# Patient Record
Sex: FEMALE | Race: BLACK OR AFRICAN AMERICAN | NOT HISPANIC OR LATINO | Employment: UNEMPLOYED | URBAN - METROPOLITAN AREA
[De-identification: names, ages, dates, MRNs, and addresses within clinical notes are randomized per-mention and may not be internally consistent; named-entity substitution may affect disease eponyms.]

---

## 2018-06-21 ENCOUNTER — HOSPITAL ENCOUNTER (OUTPATIENT)
Facility: MEDICAL CENTER | Age: 33
End: 2018-06-21
Attending: OBSTETRICS & GYNECOLOGY | Admitting: OBSTETRICS & GYNECOLOGY

## 2018-06-21 VITALS
HEART RATE: 108 BPM | DIASTOLIC BLOOD PRESSURE: 70 MMHG | SYSTOLIC BLOOD PRESSURE: 118 MMHG | RESPIRATION RATE: 16 BRPM | TEMPERATURE: 98.1 F

## 2018-06-21 LAB
ALBUMIN SERPL BCP-MCNC: 3.8 G/DL (ref 3.2–4.9)
ALBUMIN/GLOB SERPL: 1.3 G/DL
ALP SERPL-CCNC: 166 U/L (ref 30–99)
ALT SERPL-CCNC: 11 U/L (ref 2–50)
ANION GAP SERPL CALC-SCNC: 10 MMOL/L (ref 0–11.9)
APPEARANCE UR: CLEAR
AST SERPL-CCNC: 19 U/L (ref 12–45)
BASOPHILS # BLD AUTO: 0.3 % (ref 0–1.8)
BASOPHILS # BLD: 0.02 K/UL (ref 0–0.12)
BILIRUB SERPL-MCNC: 0.3 MG/DL (ref 0.1–1.5)
BUN SERPL-MCNC: 6 MG/DL (ref 8–22)
CALCIUM SERPL-MCNC: 9.4 MG/DL (ref 8.5–10.5)
CHLORIDE SERPL-SCNC: 104 MMOL/L (ref 96–112)
CO2 SERPL-SCNC: 24 MMOL/L (ref 20–33)
COLOR UR AUTO: YELLOW
CREAT SERPL-MCNC: 0.76 MG/DL (ref 0.5–1.4)
EOSINOPHIL # BLD AUTO: 0.07 K/UL (ref 0–0.51)
EOSINOPHIL NFR BLD: 0.9 % (ref 0–6.9)
ERYTHROCYTE [DISTWIDTH] IN BLOOD BY AUTOMATED COUNT: 40.6 FL (ref 35.9–50)
GLOBULIN SER CALC-MCNC: 3 G/DL (ref 1.9–3.5)
GLUCOSE BLD-MCNC: 130 MG/DL (ref 65–99)
GLUCOSE SERPL-MCNC: 131 MG/DL (ref 65–99)
GLUCOSE UR QL STRIP.AUTO: >=1000 MG/DL
HCT VFR BLD AUTO: 35.7 % (ref 37–47)
HGB BLD-MCNC: 12 G/DL (ref 12–16)
IMM GRANULOCYTES # BLD AUTO: 0.11 K/UL (ref 0–0.11)
IMM GRANULOCYTES NFR BLD AUTO: 1.4 % (ref 0–0.9)
KETONES UR QL STRIP.AUTO: NEGATIVE MG/DL
LEUKOCYTE ESTERASE UR QL STRIP.AUTO: ABNORMAL
LYMPHOCYTES # BLD AUTO: 1.89 K/UL (ref 1–4.8)
LYMPHOCYTES NFR BLD: 24.4 % (ref 22–41)
MCH RBC QN AUTO: 28.8 PG (ref 27–33)
MCHC RBC AUTO-ENTMCNC: 33.6 G/DL (ref 33.6–35)
MCV RBC AUTO: 85.8 FL (ref 81.4–97.8)
MONOCYTES # BLD AUTO: 0.81 K/UL (ref 0–0.85)
MONOCYTES NFR BLD AUTO: 10.4 % (ref 0–13.4)
NEUTROPHILS # BLD AUTO: 4.86 K/UL (ref 2–7.15)
NEUTROPHILS NFR BLD: 62.6 % (ref 44–72)
NITRITE UR QL STRIP.AUTO: NEGATIVE
NRBC # BLD AUTO: 0 K/UL
NRBC BLD-RTO: 0 /100 WBC
PH UR STRIP.AUTO: 7 [PH]
PLATELET # BLD AUTO: 207 K/UL (ref 164–446)
PMV BLD AUTO: 11.5 FL (ref 9–12.9)
POTASSIUM SERPL-SCNC: 3.6 MMOL/L (ref 3.6–5.5)
PROT SERPL-MCNC: 6.8 G/DL (ref 6–8.2)
PROT UR QL STRIP: 30 MG/DL
RBC # BLD AUTO: 4.16 M/UL (ref 4.2–5.4)
RBC UR QL AUTO: ABNORMAL
SODIUM SERPL-SCNC: 138 MMOL/L (ref 135–145)
SP GR UR: 1.01
URATE SERPL-MCNC: 1.7 MG/DL (ref 1.9–8.2)
WBC # BLD AUTO: 7.8 K/UL (ref 4.8–10.8)

## 2018-06-21 PROCEDURE — 59025 FETAL NON-STRESS TEST: CPT | Performed by: OBSTETRICS & GYNECOLOGY

## 2018-06-21 PROCEDURE — 84550 ASSAY OF BLOOD/URIC ACID: CPT

## 2018-06-21 PROCEDURE — 85025 COMPLETE CBC W/AUTO DIFF WBC: CPT

## 2018-06-21 PROCEDURE — 80053 COMPREHEN METABOLIC PANEL: CPT

## 2018-06-21 PROCEDURE — 82962 GLUCOSE BLOOD TEST: CPT

## 2018-06-21 PROCEDURE — 36415 COLL VENOUS BLD VENIPUNCTURE: CPT

## 2018-06-21 PROCEDURE — 81002 URINALYSIS NONAUTO W/O SCOPE: CPT

## 2018-06-21 NOTE — PROGRESS NOTES
[facility] LABOR AND DELIVERY TRIAGE PROGRESS NOTE    PATIENT ID:  NAME:  Salomon Ramos  MRN:               [id number]  YOB: 1985     33 y.o. female  at 33w1d by LMP and confirmed by 7 wk US - has had good PNC in [place] and came to [place] 1 day ago.    Subjective: Pt reports to triage wanting to make sure baby is doing fine. No complaints whatsoever, but wants to get checked.   Chart reviewing from her prenatal notes (she brought with her) show hx of progestin and nifedipine during pregnancy - she reports taking nothing recently.  Pregnancy complicated by elevated GTT - taking no medication for it    negative  For CTXS.   negative Feels pain   negative for LOF  negative for vaginal bleeding.   positive for fetal movement    ROS: Patient denies any fever chills, nausea, vomiting, headache, chest pain, shortness of breath, or dysuria or unusual swelling of hands or feet.     Surgery Hx:  None    PMH:  None  Denies any hx of STI's    Meds:  PNV    Allergies:  NKDA    Substance:  Denies alcohol/tobacco/recreational drug use      Objective:    Vitals:    18 0910 18 0921   BP: 140/91 132/84   Pulse: 94 95     No data recorded.    General: pleasant gravid female, No acute distress, resting comfortably in bed  HEENT: normocephalic, nontraumatic, PERRLA, EOMI, neck supple  Cardiovascular: Heart RRR with grade 2 systolic murmur. Distal Pulses 2+  Respiratory: symmetric chest expansion, lungs CTAB, with no wheezes, rales, rhonci  Abdomen: gravid, nontender, leopolds cephalic, fundal height measures 33 cm  Musculoskeletal: strength 5/5 in four extremities  Neuro: non focal with no numbness, tingling or changes in sensation    Cervix:  closed/thick/high  [place]: Uterine Contractions Q5 minutes but feeling nothing  FHRM: Baseline 150, Accels to 165, no decels, moderate variability    Assessment: 33 y.o. female  at 33w1d w/ GDMA1 - not in active labor - isolated elevated BP now w/ normal range  pressures    Fingerstick glucose 130  Urine dip w/ glucose and trace protein    Plan:   1. [facility]H labs- will d/c if result normal  2. Triage RN made appointment for her for 2 wks from now w/ Dr. Fair  3. Scan documents  4. Routine prenatal return precautions (cxn's, bleeding, LOF, decreased movement)  5. Increased hydration and impoved diet and activity    Discussed case with Dr. Valencia, TPN Attending. Case was discussed and attending agreed with plan prior to discharge of patient.

## 2018-06-21 NOTE — PROGRESS NOTES
"0905-pt presents from home to \"check on the baby\", states that she moved here from [facility] 2 days ago and tried to set up care at [facility] and was informed that they don't have an appointment for her, so she wanted to make sure the baby was OK after her move, no c/o leaking, bleeding, pain or uc's, states baby is moving normally, placed on external monitors, vs taken and set for q 10 min due to elevation, ua dipped with 1+ protein, denies headaches, visual disturbances and RUQ pain, reflexes normal, no clonus, mild swelling BL in lower extremities, TC Dr Obregon, will be in to see pt  0930-Dr Obregon in, assessment done, wants fFn collected and SVE, if closed no need to send fFn, also PIH labs ordered  0935-SSE performed, cervix noted and very long, fFn collected, SVE closed/thick/high  0940-PIH labs collected and sent  1020-labs back, TC Dr Obregon, reviewed labs and pressures, discharge order received  1030-pt discharged home with PTL precautions and an appt set up with Dr Abdalla, verbalized understanding, left ambulatory on her own  "

## 2018-07-29 ENCOUNTER — APPOINTMENT (OUTPATIENT)
Dept: RADIOLOGY | Facility: MEDICAL CENTER | Age: 33
End: 2018-07-29
Attending: NURSE PRACTITIONER
Payer: COMMERCIAL

## 2018-07-29 ENCOUNTER — HOSPITAL ENCOUNTER (INPATIENT)
Facility: MEDICAL CENTER | Age: 33
LOS: 2 days | End: 2018-07-31
Attending: OBSTETRICS & GYNECOLOGY | Admitting: OBSTETRICS & GYNECOLOGY
Payer: COMMERCIAL

## 2018-07-29 LAB
ABO GROUP BLD: NORMAL
ALBUMIN SERPL BCP-MCNC: 3.1 G/DL (ref 3.2–4.9)
ALBUMIN/GLOB SERPL: 1.4 G/DL
ALP SERPL-CCNC: 170 U/L (ref 30–99)
ALT SERPL-CCNC: 15 U/L (ref 2–50)
AMPHET UR QL SCN: NEGATIVE
ANION GAP SERPL CALC-SCNC: 11 MMOL/L (ref 0–11.9)
AST SERPL-CCNC: 36 U/L (ref 12–45)
AST SERPL-CCNC: 37 U/L (ref 12–45)
BARBITURATES UR QL SCN: NEGATIVE
BASOPHILS # BLD AUTO: 0.2 % (ref 0–1.8)
BASOPHILS # BLD: 0.02 K/UL (ref 0–0.12)
BENZODIAZ UR QL SCN: NEGATIVE
BILIRUB SERPL-MCNC: 0.4 MG/DL (ref 0.1–1.5)
BLD GP AB SCN SERPL QL: NORMAL
BUN SERPL-MCNC: 9 MG/DL (ref 8–22)
BUN SERPL-MCNC: 9 MG/DL (ref 8–22)
BZE UR QL SCN: NEGATIVE
CALCIUM SERPL-MCNC: 8.8 MG/DL (ref 8.5–10.5)
CANNABINOIDS UR QL SCN: NEGATIVE
CHLORIDE SERPL-SCNC: 102 MMOL/L (ref 96–112)
CO2 SERPL-SCNC: 18 MMOL/L (ref 20–33)
CREAT SERPL-MCNC: 0.69 MG/DL (ref 0.5–1.4)
CREAT SERPL-MCNC: 0.71 MG/DL (ref 0.5–1.4)
EOSINOPHIL # BLD AUTO: 0.01 K/UL (ref 0–0.51)
EOSINOPHIL NFR BLD: 0.1 % (ref 0–6.9)
ERYTHROCYTE [DISTWIDTH] IN BLOOD BY AUTOMATED COUNT: 43.2 FL (ref 35.9–50)
ERYTHROCYTE [DISTWIDTH] IN BLOOD BY AUTOMATED COUNT: 43.4 FL (ref 35.9–50)
GLOBULIN SER CALC-MCNC: 2.2 G/DL (ref 1.9–3.5)
GLUCOSE SERPL-MCNC: 160 MG/DL (ref 65–99)
HBV SURFACE AG SER QL: NEGATIVE
HCT VFR BLD AUTO: 33.8 % (ref 37–47)
HCT VFR BLD AUTO: 40.1 % (ref 37–47)
HGB BLD-MCNC: 11.2 G/DL (ref 12–16)
HGB BLD-MCNC: 13.3 G/DL (ref 12–16)
HOLDING TUBE BB 8507: NORMAL
IMM GRANULOCYTES # BLD AUTO: 0.1 K/UL (ref 0–0.11)
IMM GRANULOCYTES NFR BLD AUTO: 1 % (ref 0–0.9)
LYMPHOCYTES # BLD AUTO: 1.97 K/UL (ref 1–4.8)
LYMPHOCYTES NFR BLD: 18.9 % (ref 22–41)
MAGNESIUM SERPL-MCNC: 4.6 MG/DL (ref 1.5–2.5)
MAGNESIUM SERPL-MCNC: 5.4 MG/DL (ref 1.5–2.5)
MCH RBC QN AUTO: 28.8 PG (ref 27–33)
MCH RBC QN AUTO: 28.9 PG (ref 27–33)
MCHC RBC AUTO-ENTMCNC: 33.1 G/DL (ref 33.6–35)
MCHC RBC AUTO-ENTMCNC: 33.2 G/DL (ref 33.6–35)
MCV RBC AUTO: 86.8 FL (ref 81.4–97.8)
MCV RBC AUTO: 87.1 FL (ref 81.4–97.8)
METHADONE UR QL SCN: NEGATIVE
MONOCYTES # BLD AUTO: 1.05 K/UL (ref 0–0.85)
MONOCYTES NFR BLD AUTO: 10.1 % (ref 0–13.4)
NEUTROPHILS # BLD AUTO: 7.26 K/UL (ref 2–7.15)
NEUTROPHILS NFR BLD: 69.7 % (ref 44–72)
NRBC # BLD AUTO: 0.02 K/UL
NRBC BLD-RTO: 0.2 /100 WBC
OPIATES UR QL SCN: NEGATIVE
OXYCODONE UR QL SCN: NEGATIVE
PCP UR QL SCN: NEGATIVE
PLATELET # BLD AUTO: 151 K/UL (ref 164–446)
PLATELET # BLD AUTO: 156 K/UL (ref 164–446)
PMV BLD AUTO: 12.3 FL (ref 9–12.9)
PMV BLD AUTO: 12.6 FL (ref 9–12.9)
POTASSIUM SERPL-SCNC: 4.2 MMOL/L (ref 3.6–5.5)
PROPOXYPH UR QL SCN: NEGATIVE
PROT SERPL-MCNC: 5.3 G/DL (ref 6–8.2)
RBC # BLD AUTO: 3.88 M/UL (ref 4.2–5.4)
RBC # BLD AUTO: 4.62 M/UL (ref 4.2–5.4)
RH BLD: NORMAL
RUBV AB SER QL: 1.9 IU/ML
SODIUM SERPL-SCNC: 131 MMOL/L (ref 135–145)
TREPONEMA PALLIDUM IGG+IGM AB [PRESENCE] IN SERUM OR PLASMA BY IMMUNOASSAY: NON REACTIVE
URATE SERPL-MCNC: 4.5 MG/DL (ref 1.9–8.2)
WBC # BLD AUTO: 10.4 K/UL (ref 4.8–10.8)
WBC # BLD AUTO: 19 K/UL (ref 4.8–10.8)

## 2018-07-29 PROCEDURE — 84550 ASSAY OF BLOOD/URIC ACID: CPT

## 2018-07-29 PROCEDURE — 700111 HCHG RX REV CODE 636 W/ 250 OVERRIDE (IP)

## 2018-07-29 PROCEDURE — 36415 COLL VENOUS BLD VENIPUNCTURE: CPT

## 2018-07-29 PROCEDURE — 80307 DRUG TEST PRSMV CHEM ANLYZR: CPT

## 2018-07-29 PROCEDURE — 87340 HEPATITIS B SURFACE AG IA: CPT

## 2018-07-29 PROCEDURE — 700101 HCHG RX REV CODE 250

## 2018-07-29 PROCEDURE — 0UQGXZZ REPAIR VAGINA, EXTERNAL APPROACH: ICD-10-PCS | Performed by: OBSTETRICS & GYNECOLOGY

## 2018-07-29 PROCEDURE — 84520 ASSAY OF UREA NITROGEN: CPT

## 2018-07-29 PROCEDURE — 86762 RUBELLA ANTIBODY: CPT

## 2018-07-29 PROCEDURE — 80053 COMPREHEN METABOLIC PANEL: CPT

## 2018-07-29 PROCEDURE — 86780 TREPONEMA PALLIDUM: CPT

## 2018-07-29 PROCEDURE — 304965 HCHG RECOVERY SERVICES

## 2018-07-29 PROCEDURE — 770002 HCHG ROOM/CARE - OB PRIVATE (112)

## 2018-07-29 PROCEDURE — 59409 OBSTETRICAL CARE: CPT

## 2018-07-29 PROCEDURE — 85025 COMPLETE CBC W/AUTO DIFF WBC: CPT

## 2018-07-29 PROCEDURE — 84450 TRANSFERASE (AST) (SGOT): CPT

## 2018-07-29 PROCEDURE — 82565 ASSAY OF CREATININE: CPT

## 2018-07-29 PROCEDURE — 76815 OB US LIMITED FETUS(S): CPT

## 2018-07-29 PROCEDURE — 83735 ASSAY OF MAGNESIUM: CPT

## 2018-07-29 PROCEDURE — 700105 HCHG RX REV CODE 258: Performed by: NURSE PRACTITIONER

## 2018-07-29 PROCEDURE — 0KQM0ZZ REPAIR PERINEUM MUSCLE, OPEN APPROACH: ICD-10-PCS | Performed by: OBSTETRICS & GYNECOLOGY

## 2018-07-29 PROCEDURE — 85027 COMPLETE CBC AUTOMATED: CPT

## 2018-07-29 PROCEDURE — 700111 HCHG RX REV CODE 636 W/ 250 OVERRIDE (IP): Performed by: STUDENT IN AN ORGANIZED HEALTH CARE EDUCATION/TRAINING PROGRAM

## 2018-07-29 PROCEDURE — 700105 HCHG RX REV CODE 258

## 2018-07-29 PROCEDURE — 0UQMXZZ REPAIR VULVA, EXTERNAL APPROACH: ICD-10-PCS | Performed by: OBSTETRICS & GYNECOLOGY

## 2018-07-29 RX ORDER — MAGNESIUM SULFATE HEPTAHYDRATE 40 MG/ML
4 INJECTION, SOLUTION INTRAVENOUS ONCE
Status: COMPLETED | OUTPATIENT
Start: 2018-07-29 | End: 2018-07-29

## 2018-07-29 RX ORDER — HYDROCODONE BITARTRATE AND ACETAMINOPHEN 10; 325 MG/1; MG/1
1 TABLET ORAL EVERY 4 HOURS PRN
Status: DISCONTINUED | OUTPATIENT
Start: 2018-07-29 | End: 2018-07-31 | Stop reason: HOSPADM

## 2018-07-29 RX ORDER — HYDROCODONE BITARTRATE AND ACETAMINOPHEN 5; 325 MG/1; MG/1
1 TABLET ORAL EVERY 4 HOURS PRN
Status: DISCONTINUED | OUTPATIENT
Start: 2018-07-29 | End: 2018-07-31 | Stop reason: HOSPADM

## 2018-07-29 RX ORDER — ONDANSETRON 2 MG/ML
4 INJECTION INTRAMUSCULAR; INTRAVENOUS EVERY 6 HOURS PRN
Status: DISCONTINUED | OUTPATIENT
Start: 2018-07-29 | End: 2018-07-29

## 2018-07-29 RX ORDER — LIDOCAINE HYDROCHLORIDE 10 MG/ML
INJECTION, SOLUTION EPIDURAL; INFILTRATION; INTRACAUDAL; PERINEURAL
Status: COMPLETED
Start: 2018-07-29 | End: 2018-07-29

## 2018-07-29 RX ORDER — LABETALOL HYDROCHLORIDE 5 MG/ML
20-80 INJECTION, SOLUTION INTRAVENOUS PRN
Status: DISCONTINUED | OUTPATIENT
Start: 2018-07-29 | End: 2018-07-31 | Stop reason: HOSPADM

## 2018-07-29 RX ORDER — MAGNESIUM SULFATE HEPTAHYDRATE 40 MG/ML
2 INJECTION, SOLUTION INTRAVENOUS CONTINUOUS
Status: DISCONTINUED | OUTPATIENT
Start: 2018-07-29 | End: 2018-07-29

## 2018-07-29 RX ORDER — ACETAMINOPHEN 325 MG/1
325 TABLET ORAL EVERY 4 HOURS PRN
Status: DISCONTINUED | OUTPATIENT
Start: 2018-07-29 | End: 2018-07-29

## 2018-07-29 RX ORDER — DOCUSATE SODIUM 100 MG/1
100 CAPSULE, LIQUID FILLED ORAL 2 TIMES DAILY PRN
Status: DISCONTINUED | OUTPATIENT
Start: 2018-07-29 | End: 2018-07-31 | Stop reason: HOSPADM

## 2018-07-29 RX ORDER — CITRIC ACID/SODIUM CITRATE 334-500MG
30 SOLUTION, ORAL ORAL EVERY 6 HOURS PRN
Status: DISCONTINUED | OUTPATIENT
Start: 2018-07-29 | End: 2018-07-29 | Stop reason: HOSPADM

## 2018-07-29 RX ORDER — LABETALOL HYDROCHLORIDE 5 MG/ML
INJECTION, SOLUTION INTRAVENOUS
Status: COMPLETED
Start: 2018-07-29 | End: 2018-07-29

## 2018-07-29 RX ORDER — VITAMIN A ACETATE, BETA CAROTENE, ASCORBIC ACID, CHOLECALCIFEROL, .ALPHA.-TOCOPHEROL ACETATE, DL-, THIAMINE MONONITRATE, RIBOFLAVIN, NIACINAMIDE, PYRIDOXINE HYDROCHLORIDE, FOLIC ACID, CYANOCOBALAMIN, CALCIUM CARBONATE, FERROUS FUMARATE, ZINC OXIDE, CUPRIC OXIDE 3080; 12; 120; 400; 1; 1.84; 3; 20; 22; 920; 25; 200; 27; 10; 2 [IU]/1; UG/1; MG/1; [IU]/1; MG/1; MG/1; MG/1; MG/1; MG/1; [IU]/1; MG/1; MG/1; MG/1; MG/1; MG/1
1 TABLET, FILM COATED ORAL EVERY MORNING
Status: DISCONTINUED | OUTPATIENT
Start: 2018-07-30 | End: 2018-07-31 | Stop reason: HOSPADM

## 2018-07-29 RX ORDER — CARBOPROST TROMETHAMINE 250 UG/ML
250 INJECTION, SOLUTION INTRAMUSCULAR
Status: DISCONTINUED | OUTPATIENT
Start: 2018-07-29 | End: 2018-07-29 | Stop reason: HOSPADM

## 2018-07-29 RX ORDER — ALUMINA, MAGNESIA, AND SIMETHICONE 2400; 2400; 240 MG/30ML; MG/30ML; MG/30ML
30 SUSPENSION ORAL EVERY 6 HOURS PRN
Status: DISCONTINUED | OUTPATIENT
Start: 2018-07-29 | End: 2018-07-29 | Stop reason: HOSPADM

## 2018-07-29 RX ORDER — ONDANSETRON 4 MG/1
4 TABLET, ORALLY DISINTEGRATING ORAL EVERY 6 HOURS PRN
Status: DISCONTINUED | OUTPATIENT
Start: 2018-07-29 | End: 2018-07-31 | Stop reason: HOSPADM

## 2018-07-29 RX ORDER — HYDRALAZINE HYDROCHLORIDE 20 MG/ML
5-10 INJECTION INTRAMUSCULAR; INTRAVENOUS PRN
Status: DISCONTINUED | OUTPATIENT
Start: 2018-07-29 | End: 2018-07-31 | Stop reason: HOSPADM

## 2018-07-29 RX ORDER — IBUPROFEN 800 MG/1
800 TABLET ORAL EVERY 8 HOURS PRN
Status: DISCONTINUED | OUTPATIENT
Start: 2018-07-29 | End: 2018-07-29

## 2018-07-29 RX ORDER — SODIUM CHLORIDE, SODIUM LACTATE, POTASSIUM CHLORIDE, CALCIUM CHLORIDE 600; 310; 30; 20 MG/100ML; MG/100ML; MG/100ML; MG/100ML
INJECTION, SOLUTION INTRAVENOUS CONTINUOUS
Status: DISCONTINUED | OUTPATIENT
Start: 2018-07-29 | End: 2018-07-29 | Stop reason: HOSPADM

## 2018-07-29 RX ORDER — LIDOCAINE HYDROCHLORIDE AND EPINEPHRINE 15; 5 MG/ML; UG/ML
INJECTION, SOLUTION EPIDURAL
Status: COMPLETED
Start: 2018-07-29 | End: 2018-07-29

## 2018-07-29 RX ORDER — IBUPROFEN 800 MG/1
800 TABLET ORAL EVERY 8 HOURS PRN
Status: DISCONTINUED | OUTPATIENT
Start: 2018-07-29 | End: 2018-07-31 | Stop reason: HOSPADM

## 2018-07-29 RX ORDER — MAGNESIUM SULFATE HEPTAHYDRATE 40 MG/ML
INJECTION, SOLUTION INTRAVENOUS
Status: COMPLETED
Start: 2018-07-29 | End: 2018-07-29

## 2018-07-29 RX ORDER — SODIUM CHLORIDE, SODIUM LACTATE, POTASSIUM CHLORIDE, CALCIUM CHLORIDE 600; 310; 30; 20 MG/100ML; MG/100ML; MG/100ML; MG/100ML
INJECTION, SOLUTION INTRAVENOUS
Status: COMPLETED
Start: 2018-07-29 | End: 2018-07-29

## 2018-07-29 RX ORDER — METHYLERGONOVINE MALEATE 0.2 MG/ML
0.2 INJECTION INTRAVENOUS
Status: DISCONTINUED | OUTPATIENT
Start: 2018-07-29 | End: 2018-07-29 | Stop reason: HOSPADM

## 2018-07-29 RX ORDER — MISOPROSTOL 200 UG/1
800 TABLET ORAL
Status: DISCONTINUED | OUTPATIENT
Start: 2018-07-29 | End: 2018-07-29 | Stop reason: HOSPADM

## 2018-07-29 RX ORDER — ACETAMINOPHEN 325 MG/1
325 TABLET ORAL EVERY 4 HOURS PRN
Status: DISCONTINUED | OUTPATIENT
Start: 2018-07-29 | End: 2018-07-31 | Stop reason: HOSPADM

## 2018-07-29 RX ORDER — MAGNESIUM SULFATE HEPTAHYDRATE 40 MG/ML
2 INJECTION, SOLUTION INTRAVENOUS CONTINUOUS
Status: DISCONTINUED | OUTPATIENT
Start: 2018-07-29 | End: 2018-07-31 | Stop reason: HOSPADM

## 2018-07-29 RX ORDER — METOCLOPRAMIDE HYDROCHLORIDE 5 MG/ML
10 INJECTION INTRAMUSCULAR; INTRAVENOUS EVERY 6 HOURS PRN
Status: DISCONTINUED | OUTPATIENT
Start: 2018-07-29 | End: 2018-07-31 | Stop reason: HOSPADM

## 2018-07-29 RX ORDER — ONDANSETRON 2 MG/ML
4 INJECTION INTRAMUSCULAR; INTRAVENOUS EVERY 6 HOURS PRN
Status: DISCONTINUED | OUTPATIENT
Start: 2018-07-29 | End: 2018-07-31 | Stop reason: HOSPADM

## 2018-07-29 RX ORDER — MISOPROSTOL 200 UG/1
800 TABLET ORAL
Status: DISCONTINUED | OUTPATIENT
Start: 2018-07-29 | End: 2018-07-31 | Stop reason: HOSPADM

## 2018-07-29 RX ORDER — OXYCODONE HYDROCHLORIDE AND ACETAMINOPHEN 5; 325 MG/1; MG/1
1 TABLET ORAL EVERY 4 HOURS PRN
Status: DISCONTINUED | OUTPATIENT
Start: 2018-07-29 | End: 2018-07-31 | Stop reason: HOSPADM

## 2018-07-29 RX ORDER — CALCIUM GLUCONATE 94 MG/ML
1 INJECTION, SOLUTION INTRAVENOUS
Status: DISCONTINUED | OUTPATIENT
Start: 2018-07-29 | End: 2018-07-31 | Stop reason: HOSPADM

## 2018-07-29 RX ORDER — OXYTOCIN 10 [USP'U]/ML
10 INJECTION, SOLUTION INTRAMUSCULAR; INTRAVENOUS
Status: DISCONTINUED | OUTPATIENT
Start: 2018-07-29 | End: 2018-07-29 | Stop reason: HOSPADM

## 2018-07-29 RX ORDER — ACETAMINOPHEN 325 MG/1
650 TABLET ORAL EVERY 4 HOURS PRN
Status: DISCONTINUED | OUTPATIENT
Start: 2018-07-29 | End: 2018-07-31 | Stop reason: HOSPADM

## 2018-07-29 RX ORDER — OXYCODONE HYDROCHLORIDE AND ACETAMINOPHEN 5; 325 MG/1; MG/1
2 TABLET ORAL EVERY 4 HOURS PRN
Status: DISCONTINUED | OUTPATIENT
Start: 2018-07-29 | End: 2018-07-31 | Stop reason: HOSPADM

## 2018-07-29 RX ADMIN — LABETALOL HYDROCHLORIDE 20 MG: 5 INJECTION, SOLUTION INTRAVENOUS at 06:46

## 2018-07-29 RX ADMIN — SODIUM CHLORIDE, POTASSIUM CHLORIDE, SODIUM LACTATE AND CALCIUM CHLORIDE: 600; 310; 30; 20 INJECTION, SOLUTION INTRAVENOUS at 04:20

## 2018-07-29 RX ADMIN — SODIUM CHLORIDE, POTASSIUM CHLORIDE, SODIUM LACTATE AND CALCIUM CHLORIDE 1000 ML: 600; 310; 30; 20 INJECTION, SOLUTION INTRAVENOUS at 21:36

## 2018-07-29 RX ADMIN — LABETALOL HYDROCHLORIDE 20 MG: 5 INJECTION, SOLUTION INTRAVENOUS at 04:59

## 2018-07-29 RX ADMIN — MAGNESIUM SULFATE IN WATER 2 G/HR: 40 INJECTION, SOLUTION INTRAVENOUS at 05:47

## 2018-07-29 RX ADMIN — LIDOCAINE HYDROCHLORIDE: 10 INJECTION, SOLUTION EPIDURAL; INFILTRATION; INTRACAUDAL; PERINEURAL at 08:40

## 2018-07-29 RX ADMIN — MAGNESIUM SULFATE HEPTAHYDRATE 4 G: 40 INJECTION, SOLUTION INTRAVENOUS at 05:26

## 2018-07-29 RX ADMIN — MAGNESIUM SULFATE IN WATER 2 G/HR: 40 INJECTION, SOLUTION INTRAVENOUS at 16:36

## 2018-07-29 RX ADMIN — MAGNESIUM SULFATE IN WATER 4 G: 40 INJECTION, SOLUTION INTRAVENOUS at 05:26

## 2018-07-29 RX ADMIN — LIDOCAINE HYDROCHLORIDE: 10 INJECTION, SOLUTION EPIDURAL; INFILTRATION; INTRACAUDAL; PERINEURAL at 08:20

## 2018-07-29 RX ADMIN — MAGNESIUM SULFATE HEPTAHYDRATE 2 G/HR: 40 INJECTION, SOLUTION INTRAVENOUS at 05:47

## 2018-07-29 RX ADMIN — Medication 20 UNITS: at 08:20

## 2018-07-29 RX ADMIN — Medication 20 UNITS: at 09:45

## 2018-07-29 RX ADMIN — SODIUM CHLORIDE, POTASSIUM CHLORIDE, SODIUM LACTATE AND CALCIUM CHLORIDE: 600; 310; 30; 20 INJECTION, SOLUTION INTRAVENOUS at 06:05

## 2018-07-29 ASSESSMENT — PAIN SCALES - GENERAL
PAINLEVEL_OUTOF10: 0
PAINLEVEL_OUTOF10: 5
PAINLEVEL_OUTOF10: 0
PAINLEVEL_OUTOF10: 0

## 2018-07-29 ASSESSMENT — PATIENT HEALTH QUESTIONNAIRE - PHQ9
1. LITTLE INTEREST OR PLEASURE IN DOING THINGS: NOT AT ALL
2. FEELING DOWN, DEPRESSED, IRRITABLE, OR HOPELESS: NOT AT ALL
SUM OF ALL RESPONSES TO PHQ9 QUESTIONS 1 AND 2: 0

## 2018-07-29 ASSESSMENT — LIFESTYLE VARIABLES
ALCOHOL_USE: NO
EVER_SMOKED: NEVER

## 2018-07-29 NOTE — PROGRESS NOTES
Brief note:   w/ Dr. Jimenez.  Given shift change, I helped Dr. Gamino with repair of 2nd degree perineal, bilateral sulcal and left labial laceration.  No complications w/ repair, hemostasis noted..    Salma Segundo MD  [facility], Women's Health

## 2018-07-29 NOTE — PROGRESS NOTES
0700  Report from NOC RN in room with pt at this time.  Pt using tug of war method for pushing, not very effective efforts at this time.  Pt instructed on changing her pushing efforts in attempt to assist with the baby coming down further in station.  Pt very tired and only pushes for a few second each time.  0745  Dr. Gamino phoned to come for delivery.  0800  RT and  in room for delivery.  0811  Delivery of the head and pt continues to have weak pushing efforts.  Attempted Violeta for delivery of the shoulder and suprapubic pressure applied from the left side as directed by Dr. Jimenez.  Attempted delivery of the posterior arm unsuccessful, continued pushing efforts with suprapubic pressure and delivery of the shoulders and body after 75 seconds.  Multiple lacerations repaired at this time.  Apgars 8/9.  Pt offered IV pain medications and does not desire during the repair.  0915  FF remains firm and repair completed at this time.  Pt holding the baby and has no complaints of nausea.  1015  Pt eating her lunch and does not desire any pain medications.  1215  Pt up to BR, voided and pericare performed.  Pt very tired and weak during this process.  IV dressing changed and reinforced.  Pt into W/C and transferred to PP.  1300  Report to PP RN in room with pt at this time.

## 2018-07-29 NOTE — CARE PLAN
Problem: Altered physiologic condition related to immediate post-delivery state and potential for bleeding/hemorrhage  Goal: Patient physiologically stable as evidenced by normal lochia, palpable uterine involution and vital signs within normal limits  Outcome: PROGRESSING AS EXPECTED  Fundus firm, lochia light,blood pressure and other vitals stable. Patient able to ambulate without dizziness. Patient intake of fluids wnl.     Problem: Alteration in comfort related to episiotomy, vaginal repair and/or after birth pains  Goal: Patient verbalizes acceptable pain level  Outcome: PROGRESSING AS EXPECTED  Pain level checked and patient states pain medicine is effective with pain control.patient demonstrates improved ease of movement patient caring for baby and self with good skill.

## 2018-07-29 NOTE — DELIVERY NOTE
VAGINAL DELIVERY PRODEDURE NOTE    PATIENT ID:  NAME:  Salomon Ramos  MRN:               [id number]  YOB: 1985    On 2018 at 8:12am, this 33 y.o., now , GBS uknown female delivered via  under no anesthesia a viable male infant weighing 8 lbs and 7 oz (3835g) with APGAR scores of 8 and 9 at one and five minutes. There was no nuchal cord. There was a shoulder dystocia lasting 75 seconds. Infant was bulb suctioned at delivery. Cord was doubly clamped immediately cut, cord gases were drawn, and infant handed to RN in attendance. An intact placenta was delivered spontaneously at 8:20am with 3 vessel cord. Upon vaginal exam, there was second degree laceration, bilateral sulcal tears, and a left labial tear which were repaired using 3.0 vicryl in the usual sterile fashion. Estimated blood loss was 300cc. Patient will be transferred to postpartum in stable condition and infant to  nursery.    Shireen Gamino M.D.    Delivery attended by  and Dr. Haynes who was present for the entire delivery.

## 2018-07-29 NOTE — PROGRESS NOTES
Pt has been here for 2hrs and this is the first set of vitals taken by RN.  BP noted to be markedly elevated.  PIH labs ordered. Hypertension protocol started.  D/w Dr. Jimenez and also started pt on magnesium sulfate.

## 2018-07-29 NOTE — H&P
"History and Physical      Salomon Ramos is a 33 y.o. year old female  at 38w6d who presents for RUCs.  Did receive prenatal care in [place], last visit 3 wks ago.  Some limited records available.    Subjective:   positive  For CTXS.   positive Feels pain   negative for LOF  negative for vaginal bleeding.   positive for fetal movement    ROS: A comprehensive review of systems was negative.    History reviewed. No pertinent past medical history.  History reviewed. No pertinent surgical history.  OB History    Para Term  AB Living   1             SAB TAB Ectopic Molar Multiple Live Births                    # Outcome Date GA Lbr Stefan/2nd Weight Sex Delivery Anes PTL Lv   1 Current                 Social History     Social History   • Marital status: Single     Spouse name: N/A   • Number of children: N/A   • Years of education: N/A     Occupational History   • Not on file.     Social History Main Topics   • Smoking status: Never Smoker   • Smokeless tobacco: Never Used   • Alcohol use Not on file   • Drug use: Unknown   • Sexual activity: Not on file     Other Topics Concern   • Not on file     Social History Narrative   • No narrative on file     Allergies: Patient has no known allergies.    Current Facility-Administered Medications:   •  LACTATED RINGERS IV SOLN, , , ,   •  oxytocin (PITOCIN) 20 UNITS/1000ML LR, , , ,     No prenatal care here.  There are no active problems to display for this patient.        Objective:      Height 1.651 m (5' 5\"), weight 80 kg (176 lb 5.9 oz).    General:   pain c UCs, alert, cooperative   Skin:   normal   HEENT:  PERRLA   Lungs:   CTA bilateral   Heart:   S1, S2 normal, no murmur, click, rub or gallop, regular rate and rhythm, brisk carotid upstroke without bruits, peripheral pulses very brisk, chest is clear without rales or wheezing, no pedal edema, no JVD, no hepatosplenomegaly   Abdomen:   gravid, NT   EFW:  3400 g   Pelvis:  Exam deferred.   FHTs: " 150 BPM + accels + decels min-mod variability   Contractions: 3 contractions in 10 min firm to palpation   Uterine Size: S=D   Presentations: Cephalic   Cervix:     Dilation: 9    Effacement: 100%    Station:  -1    Consistency: Medium    Position: Middle     Complete OB US  Limited US pending.    Lab Review  Lab:   Blood type: B     Recent Results (from the past 5880 hour(s))   POC UA    Collection Time: 06/21/18  9:05 AM   Result Value Ref Range    POC Color Yellow     POC Appearance Clear     POC Glucose >=1000 (A) Negative mg/dL    POC Ketones Negative Negative mg/dL    POC Specific Gravity 1.015 1.005 - 1.030    POC Blood Trace-intact (A) Negative    POC Urine PH 7.0 5.0 - 8.0    POC Protein 30 (A) Negative mg/dL    POC Nitrites Negative Negative    POC Leukocyte Esterase Small (A) Negative   ACCU-CHEK GLUCOSE    Collection Time: 06/21/18  9:24 AM   Result Value Ref Range    Glucose - Accu-Ck 130 (H) 65 - 99 mg/dL   CBC WITH DIFFERENTIAL    Collection Time: 06/21/18  9:40 AM   Result Value Ref Range    WBC 7.8 4.8 - 10.8 K/uL    RBC 4.16 (L) 4.20 - 5.40 M/uL    Hemoglobin 12.0 12.0 - 16.0 g/dL    Hematocrit 35.7 (L) 37.0 - 47.0 %    MCV 85.8 81.4 - 97.8 fL    MCH 28.8 27.0 - 33.0 pg    MCHC 33.6 33.6 - 35.0 g/dL    RDW 40.6 35.9 - 50.0 fL    Platelet Count 207 164 - 446 K/uL    MPV 11.5 9.0 - 12.9 fL    Neutrophils-Polys 62.60 44.00 - 72.00 %    Lymphocytes 24.40 22.00 - 41.00 %    Monocytes 10.40 0.00 - 13.40 %    Eosinophils 0.90 0.00 - 6.90 %    Basophils 0.30 0.00 - 1.80 %    Immature Granulocytes 1.40 (H) 0.00 - 0.90 %    Nucleated RBC 0.00 /100 WBC    Neutrophils (Absolute) 4.86 2.00 - 7.15 K/uL    Lymphs (Absolute) 1.89 1.00 - 4.80 K/uL    Monos (Absolute) 0.81 0.00 - 0.85 K/uL    Eos (Absolute) 0.07 0.00 - 0.51 K/uL    Baso (Absolute) 0.02 0.00 - 0.12 K/uL    Immature Granulocytes (abs) 0.11 0.00 - 0.11 K/uL    NRBC (Absolute) 0.00 K/uL   COMP METABOLIC PANEL    Collection Time: 06/21/18  9:40 AM    Result Value Ref Range    Sodium 138 135 - 145 mmol/L    Potassium 3.6 3.6 - 5.5 mmol/L    Chloride 104 96 - 112 mmol/L    Co2 24 20 - 33 mmol/L    Anion Gap 10.0 0.0 - 11.9    Glucose 131 (H) 65 - 99 mg/dL    Bun 6 (L) 8 - 22 mg/dL    Creatinine 0.76 0.50 - 1.40 mg/dL    Calcium 9.4 8.5 - 10.5 mg/dL    AST(SGOT) 19 12 - 45 U/L    ALT(SGPT) 11 2 - 50 U/L    Alkaline Phosphatase 166 (H) 30 - 99 U/L    Total Bilirubin 0.3 0.1 - 1.5 mg/dL    Albumin 3.8 3.2 - 4.9 g/dL    Total Protein 6.8 6.0 - 8.2 g/dL    Globulin 3.0 1.9 - 3.5 g/dL    A-G Ratio 1.3 g/dL   URIC ACID    Collection Time: 06/21/18  9:40 AM   Result Value Ref Range    Uric Acid 1.7 (L) 1.9 - 8.2 mg/dL   ESTIMATED GFR    Collection Time: 06/21/18  9:40 AM   Result Value Ref Range    GFR If African American >60 >60 mL/min/1.73 m 2    GFR If Non African American >60 >60 mL/min/1.73 m 2   OBSTETRIC PANEL (CBCD, RPR, RUBELLA, HBSAG)    Collection Time: 07/29/18  3:14 AM   Result Value Ref Range    WBC 10.4 4.8 - 10.8 K/uL    RBC 4.62 4.20 - 5.40 M/uL    Hemoglobin 13.3 12.0 - 16.0 g/dL    Hematocrit 40.1 37.0 - 47.0 %    MCV 86.8 81.4 - 97.8 fL    MCH 28.8 27.0 - 33.0 pg    MCHC 33.2 (L) 33.6 - 35.0 g/dL    RDW 43.2 35.9 - 50.0 fL    Platelet Count 156 (L) 164 - 446 K/uL    MPV 12.3 9.0 - 12.9 fL    Neutrophils-Polys 69.70 44.00 - 72.00 %    Lymphocytes 18.90 (L) 22.00 - 41.00 %    Monocytes 10.10 0.00 - 13.40 %    Eosinophils 0.10 0.00 - 6.90 %    Basophils 0.20 0.00 - 1.80 %    Immature Granulocytes 1.00 (H) 0.00 - 0.90 %    Nucleated RBC 0.20 /100 WBC    Neutrophils (Absolute) 7.26 (H) 2.00 - 7.15 K/uL    Lymphs (Absolute) 1.97 1.00 - 4.80 K/uL    Monos (Absolute) 1.05 (H) 0.00 - 0.85 K/uL    Eos (Absolute) 0.01 0.00 - 0.51 K/uL    Baso (Absolute) 0.02 0.00 - 0.12 K/uL    Immature Granulocytes (abs) 0.10 0.00 - 0.11 K/uL    NRBC (Absolute) 0.02 K/uL    Rubella IgG Antibody 1.90 IU/mL    Syphilis, Treponemal Qual Non Reactive Non Reactive    Hepatitis B  Surface Antigen Negative Negative   IP OBSTETRIC PANEL-BB (ABORH/ANTIBODY SCREEN)    Collection Time: 07/29/18  3:14 AM   Result Value Ref Range    ABO Grouping Only B     Rh Grouping Only POS     Antibody Screen Scrn NEG         Assessment:   1.  IUP at 38w6d  2.  Labor status: Active phase labor.  3.  Cat 2 FHTs     Plan:     Admit to L&D  GBS unknown - but term.  Routine labs  Pain management prn - pt desires natural labor.

## 2018-07-29 NOTE — PROGRESS NOTES
, EDC , GA 38w6d. Pt presents to L&D with c/o UCs starting at 2200 growing stronger and closer together. Pt denies LOF or VB and reports + Fetal movement. Pt escorted to triage room, oriented to room and unit, and external monitors applied. POC discussed with pt and family and encouraged to state needs or questions at any time.    0302 RHODA Brandt CNM called and given report. Orders received.    0334 [facility] at bedside    0350 SROM clear fluid    0403 SVE by MELECIO Toro RN /-2. RHODA Brandt CNM called, in a delivery and unable to answer    0412 Pt transferred to labor room    0515 RHODA Brandt CNM at bedside. SVE by provider C/+2. Pt started pushing with RN at bedside.    0700 Report given to RHODA Elias RN at bedside

## 2018-07-30 PROCEDURE — 700102 HCHG RX REV CODE 250 W/ 637 OVERRIDE(OP): Performed by: STUDENT IN AN ORGANIZED HEALTH CARE EDUCATION/TRAINING PROGRAM

## 2018-07-30 PROCEDURE — A9270 NON-COVERED ITEM OR SERVICE: HCPCS | Performed by: STUDENT IN AN ORGANIZED HEALTH CARE EDUCATION/TRAINING PROGRAM

## 2018-07-30 PROCEDURE — 700102 HCHG RX REV CODE 250 W/ 637 OVERRIDE(OP): Performed by: NURSE PRACTITIONER

## 2018-07-30 PROCEDURE — A9270 NON-COVERED ITEM OR SERVICE: HCPCS | Performed by: NURSE PRACTITIONER

## 2018-07-30 PROCEDURE — 700111 HCHG RX REV CODE 636 W/ 250 OVERRIDE (IP)

## 2018-07-30 PROCEDURE — 700111 HCHG RX REV CODE 636 W/ 250 OVERRIDE (IP): Performed by: STUDENT IN AN ORGANIZED HEALTH CARE EDUCATION/TRAINING PROGRAM

## 2018-07-30 PROCEDURE — 90471 IMMUNIZATION ADMIN: CPT

## 2018-07-30 PROCEDURE — 90707 MMR VACCINE SC: CPT

## 2018-07-30 PROCEDURE — 3E0234Z INTRODUCTION OF SERUM, TOXOID AND VACCINE INTO MUSCLE, PERCUTANEOUS APPROACH: ICD-10-PCS | Performed by: OBSTETRICS & GYNECOLOGY

## 2018-07-30 PROCEDURE — 770002 HCHG ROOM/CARE - OB PRIVATE (112)

## 2018-07-30 PROCEDURE — 700112 HCHG RX REV CODE 229: Performed by: STUDENT IN AN ORGANIZED HEALTH CARE EDUCATION/TRAINING PROGRAM

## 2018-07-30 RX ADMIN — HYDROCODONE BITARTRATE AND ACETAMINOPHEN 1 TABLET: 5; 325 TABLET ORAL at 23:52

## 2018-07-30 RX ADMIN — IBUPROFEN 800 MG: 800 TABLET, FILM COATED ORAL at 08:02

## 2018-07-30 RX ADMIN — MAGNESIUM SULFATE IN WATER 2 G/HR: 40 INJECTION, SOLUTION INTRAVENOUS at 03:07

## 2018-07-30 RX ADMIN — DOCUSATE SODIUM 100 MG: 100 CAPSULE, LIQUID FILLED ORAL at 05:09

## 2018-07-30 RX ADMIN — Medication 1 TABLET: at 05:10

## 2018-07-30 RX ADMIN — MEASLES, MUMPS, AND RUBELLA VIRUS VACCINE LIVE 0.5 ML: 1000; 12500; 1000 INJECTION, POWDER, LYOPHILIZED, FOR SUSPENSION SUBCUTANEOUS at 23:44

## 2018-07-30 RX ADMIN — IBUPROFEN 800 MG: 800 TABLET, FILM COATED ORAL at 23:52

## 2018-07-30 RX ADMIN — HYDROCODONE BITARTRATE AND ACETAMINOPHEN 1 TABLET: 5; 325 TABLET ORAL at 08:02

## 2018-07-30 ASSESSMENT — PAIN SCALES - GENERAL
PAINLEVEL_OUTOF10: 0
PAINLEVEL_OUTOF10: 6
PAINLEVEL_OUTOF10: 4
PAINLEVEL_OUTOF10: 5
PAINLEVEL_OUTOF10: 2

## 2018-07-30 NOTE — PROGRESS NOTES
Assessment done. Pt. c/o pain and pain medication given.Fundus firm.Lochia light. Magnesium sulfate discontinued, IV saline locked.Abdomen slightly distended,but soft. Patient ambulated to nursery to visit baby. Gait steady.

## 2018-07-30 NOTE — CARE PLAN
Problem: Communication  Goal: The ability to communicate needs accurately and effectively will improve  Outcome: PROGRESSING AS EXPECTED  Patient ambulating to bathroom well, having adequate urine output. Running IV mag as ordered. BP within normal limits. Continue to monitor patient hourly.     Problem: Pain Management  Goal: Pain level will decrease to patient's comfort goal  Outcome: PROGRESSING AS EXPECTED  Patient likes to call for PRN pain medication when needed.

## 2018-07-30 NOTE — DISCHARGE PLANNING
:    Discharge Planning Assessment Post Partum    Reason for Referral: Prenatal care in [place], walk-in.  Address: [address] [place] [address] [place] [address]  Type of Living Situation: MOB is from [place] and is in [place] visiting her Aunt.  She plans to return to [facility]   Mom Diagnosis: Pregnancy  Baby Diagnosis:   Primary Language: MOB does speak English    Name of Baby: Coretta Ramos  Father of the Baby: Mauro Ramos  Involved in baby’s care? Yes  Contact Information: Unknown    Prenatal Care: Yes, in [place]  Mom's PCP: None  PCP for new baby:Pediatrician list provided    Support System: Aunt, FOB, and family  Coping/Bonding between mother & baby: Yes  Source of Feeding: Breast and bottle feeding  Supplies for Infant: Prepared, has a car seat, clothes, diapers, etc    Mom's Insurance: Uninsured, working out a package rate with business office  Baby Covered on Insurance:No  Mother Employed/School: Not currently  Other children in the home/names & ages: 1st baby    Financial Hardship/Income: No   Mom's Mental status: A&Ox4  Services used prior to admit: Unknown    CPS History: No  Psychiatric History: No  Domestic Violence History: No  Drug/ETOH History: No    Resources Provided:  Pediatrician list, children and family resource list, list of WIC locations, bag of  supplies  Referrals Made: emailed PFA to assist with financial questions.  Provided referral to the [facility]     Clearance for Discharge: Infant is cleared to discharge home with MOB.

## 2018-07-30 NOTE — PROGRESS NOTES
Post Partum Progress Note    Name:   Salomon Ramos   Date/Time:  7/30/2018 - 7:04 AM  Chief Admitting Dx:  Pregnancy   Indication for care in labor or delivery  Indication for care in labor or delivery  Delivery Type:  vaginal, spontaneous  Post-Op/Post Partum Days #:  1    Subjective:  Abdominal pain: no  Ambulating:   yes  Tolerating liquids:  yes  Tolerating food:  yes common adult  Flatus:   yes  BM:    no  Bleeding:   with a small amount of bleeding  Voiding:   yes  Dizziness:   no  Feeding:   breast    Vitals:    07/30/18 0300 07/30/18 0400 07/30/18 0500 07/30/18 0600   BP: 123/83 102/64 105/71 105/65   Pulse: (!) 109 (!) 105 (!) 106 (!) 107   Resp: 17 16 17 16   Temp:  36.9 °C (98.5 °F)  36.6 °C (97.8 °F)   SpO2: 97% 99% 99% 99%   Weight:       Height:           Exam:  Breast: Tenderness no Engorgement no  Abdomen: Abdomen soft, non-tender. BS normal. No masses,  No organomegaly  Fundal Tenderness:  no  Fundus Firm: yes  Incision: none  Below umbilicus: yes  Perineum: perineum intact  Lochia: mild  Extremities: trace extremities, peripheral pulses and reflexes normal    Meds:  Current Facility-Administered Medications   Medication Dose   • ondansetron (ZOFRAN ODT) dispertab 4 mg  4 mg   • metoclopramide (REGLAN) injection 10 mg  10 mg   • oxytocin (PITOCIN) infusion (for postpartum)  2,000 mL/hr    Followed by   • oxytocin (PITOCIN) infusion (for postpartum)   mL/hr   • ibuprofen (MOTRIN) tablet 800 mg  800 mg   • acetaminophen (TYLENOL) tablet 325 mg  325 mg   • HYDROcodone-acetaminophen (NORCO) 5-325 MG per tablet 1 Tab  1 Tab   • HYDROcodone/acetaminophen (NORCO)  MG per tablet 1 Tab  1 Tab   • labetalol (NORMODYNE,TRANDATE) injection 20-80 mg  20-80 mg    Or   • hydrALAZINE (APRESOLINE) injection 5-10 mg  5-10 mg   • miSOPROStol (CYTOTEC) tablet 800 mcg  800 mcg   • oxytocin (PITOCIN) infusion (for postpartum)   mL/hr   • oxyCODONE-acetaminophen (PERCOCET) 5-325 MG per  tablet 1 Tab  1 Tab   • oxyCODONE-acetaminophen (PERCOCET) 5-325 MG per tablet 2 Tab  2 Tab   • ondansetron (ZOFRAN) syringe/vial injection 4 mg  4 mg   • docusate sodium (COLACE) capsule 100 mg  100 mg   • prenatal plus vitamin (STUARTNATAL 1+1) 27-1 MG tablet 1 Tab  1 Tab   • magnesium hydroxide (MILK OF MAGNESIA) suspension 30 mL  30 mL   • acetaminophen (TYLENOL) tablet 650 mg  650 mg   • magnesium sulfate 20 g/500mL infusion  2 g/hr   • calcium GLUConate injection 1 g  1 g       Labs:   Recent Labs      07/29/18   0314  07/29/18   1109   WBC  10.4  19.0*   RBC  4.62  3.88*   HEMOGLOBIN  13.3  11.2*   HEMATOCRIT  40.1  33.8*   MCV  86.8  87.1   MCH  28.8  28.9   MCHC  33.2*  33.1*   RDW  43.2  43.4   PLATELETCT  156*  151*   MPV  12.3  12.6       Assessment:  Chief Admitting Dx:  Pregnancy   Indication for care in labor or delivery  Indication for care in labor or delivery  Delivery Type:  vaginal, spontaneous  Tubal Ligation:  no    Plan:  Continue routine post partum care. Advance care, encourage ambulation, pain control, stop Mag today at 24hrs after delivery. Baby in NICU. Anticipate d/c home tomorrow, PPD#2.    Steph Winters PANANDA.

## 2018-07-30 NOTE — PROGRESS NOTES
Assessment done. Patient denies need for pain medicine.patient denies headache, stuffiness,or blurry vision. Does c/o dry mouth. Patient denies shortness of breath. Bowel sounds active. Reflexes 2+ bilaterally lower extremities and patient without clonus.patient walking to bathroom with standby assistance.patient voiding with at least 500cc's per void. IV patent without redness or swelling. Magnesium running at 2 grams /hr and LR at 75 cc/hr.Assisted patient with breastfeeding.

## 2018-07-30 NOTE — PROGRESS NOTES
Assisted with breast attempt, no sustained latch, reviewed hand expression technique, provided New Beginnings booklet. Reviewed pump use and settings, flange fit with 25mm size appropriate, 25% suction to comfort. Plan to practice breastfeeding first then follow with pumping and supplementation per MD order.

## 2018-07-31 VITALS
RESPIRATION RATE: 18 BRPM | HEART RATE: 71 BPM | HEIGHT: 65 IN | BODY MASS INDEX: 29.38 KG/M2 | TEMPERATURE: 97.7 F | WEIGHT: 176.37 LBS | DIASTOLIC BLOOD PRESSURE: 74 MMHG | SYSTOLIC BLOOD PRESSURE: 124 MMHG | OXYGEN SATURATION: 99 %

## 2018-07-31 RX ORDER — PSEUDOEPHEDRINE HCL 30 MG
100 TABLET ORAL 2 TIMES DAILY PRN
Qty: 60 CAP | Refills: 0 | Status: SHIPPED | OUTPATIENT
Start: 2018-07-31 | End: 2018-07-31

## 2018-07-31 RX ORDER — PSEUDOEPHEDRINE HCL 30 MG
100 TABLET ORAL 2 TIMES DAILY PRN
Qty: 60 CAP | Refills: 0 | Status: SHIPPED | OUTPATIENT
Start: 2018-07-31

## 2018-07-31 RX ORDER — IBUPROFEN 800 MG/1
800 TABLET ORAL EVERY 8 HOURS PRN
Qty: 60 TAB | Refills: 0 | Status: SHIPPED | OUTPATIENT
Start: 2018-07-31

## 2018-07-31 ASSESSMENT — PAIN SCALES - GENERAL
PAINLEVEL_OUTOF10: 2
PAINLEVEL_OUTOF10: 3
PAINLEVEL_OUTOF10: 0

## 2018-07-31 NOTE — DISCHARGE SUMMARY
Discharge Summary:      Salomon Ramos      Admit Date:   2018  Discharge Date:  2018     Admitting diagnosis:  Pregnancy   Indication for care in labor or delivery  Indication for care in labor or delivery  Discharge Diagnosis: Status post vaginal, spontaneous.  Pregnancy Complications: limited prenatal care in [place], PIH, Shoulder dystocia  Tubal Ligation:  no        History:  History reviewed. No pertinent past medical history.  OB History    Para Term  AB Living   1             SAB TAB Ectopic Molar Multiple Live Births                    # Outcome Date GA Lbr Stefan/2nd Weight Sex Delivery Anes PTL Lv   1 Current                    Patient has no known allergies.  There are no active problems to display for this patient.       Hospital Course:   33 y.o. , now para 1, was admitted with the above mentioned diagnosis, underwent Active Labor, vaginal, spontaneous. Patient postpartum course was unremarkable, with progressive advancement in diet , ambulation and toleration of oral analgesia. Patient without complaints today and desires discharge.      Vitals:    18 1600 18 2000 18 0000 18 0400   BP: 100/71 127/75 111/58 133/87   Pulse: (!) 103 98 (!) 102 (!) 103   Resp: 18 20 20 20   Temp: 36.7 °C (98 °F) 36.9 °C (98.4 °F) 37 °C (98.6 °F) 36.6 °C (97.8 °F)   SpO2: 99% 100% 97% 100%   Weight:       Height:           Current Facility-Administered Medications   Medication Dose   • ondansetron (ZOFRAN ODT) dispertab 4 mg  4 mg   • metoclopramide (REGLAN) injection 10 mg  10 mg   • oxytocin (PITOCIN) infusion (for postpartum)   mL/hr   • ibuprofen (MOTRIN) tablet 800 mg  800 mg   • acetaminophen (TYLENOL) tablet 325 mg  325 mg   • HYDROcodone-acetaminophen (NORCO) 5-325 MG per tablet 1 Tab  1 Tab   • HYDROcodone/acetaminophen (NORCO)  MG per tablet 1 Tab  1 Tab   • labetalol (NORMODYNE,TRANDATE) injection 20-80 mg  20-80 mg    Or   • hydrALAZINE  (APRESOLINE) injection 5-10 mg  5-10 mg   • miSOPROStol (CYTOTEC) tablet 800 mcg  800 mcg   • oxytocin (PITOCIN) infusion (for postpartum)   mL/hr   • oxyCODONE-acetaminophen (PERCOCET) 5-325 MG per tablet 1 Tab  1 Tab   • oxyCODONE-acetaminophen (PERCOCET) 5-325 MG per tablet 2 Tab  2 Tab   • ondansetron (ZOFRAN) syringe/vial injection 4 mg  4 mg   • docusate sodium (COLACE) capsule 100 mg  100 mg   • prenatal plus vitamin (STUARTNATAL 1+1) 27-1 MG tablet 1 Tab  1 Tab   • magnesium hydroxide (MILK OF MAGNESIA) suspension 30 mL  30 mL   • acetaminophen (TYLENOL) tablet 650 mg  650 mg   • magnesium sulfate 20 g/500mL infusion  2 g/hr   • calcium GLUConate injection 1 g  1 g       Exam:  Breast Exam: negative  Abdomen: Abdomen soft, non-tender. BS normal. No masses,  No organomegaly  Fundus Non Tender: yes  Incision: none  Perineum: perineum intact  Extremity: extremities, peripheral pulses and reflexes normal, no edema, redness or tenderness in the calves or thighs     Labs:  Recent Labs      07/29/18   0314  07/29/18   1109   WBC  10.4  19.0*   RBC  4.62  3.88*   HEMOGLOBIN  13.3  11.2*   HEMATOCRIT  40.1  33.8*   MCV  86.8  87.1   MCH  28.8  28.9   MCHC  33.2*  33.1*   RDW  43.2  43.4   PLATELETCT  156*  151*   MPV  12.3  12.6        Activity:   Discharge to home  Pelvic Rest x 6 weeks    Assessment:  normal postpartum course  Discharge Assessment: No heavy bleeding or foul vaginal discharge   BPs normalized after 24 hours magnesium sulfate     [facility] for vaginal ; 1 week for incision check.      Discharge Meds:   Current Outpatient Prescriptions   Medication Sig Dispense Refill   • ibuprofen (MOTRIN) 800 MG Tab Take 1 Tab by mouth every 8 hours as needed (For cramping after delivery; do not give if patient is receiving ketorolac (Toradol)). 60 Tab 0   • docusate sodium 100 MG Cap Take 100 mg by mouth 2 times a day as needed for Constipation. 60 Cap 0       Rafaela F.  ROSE [place].

## 2018-07-31 NOTE — PROGRESS NOTES
Bedside report done, patient is socializing with visitors. Denies apin @ this time. Will continue to monitor.

## 2018-07-31 NOTE — CARE PLAN
Problem: Altered physiologic condition related to immediate post-delivery state and potential for bleeding/hemorrhage  Goal: Patient physiologically stable as evidenced by normal lochia, palpable uterine involution and vital signs within normal limits  Outcome: PROGRESSING AS EXPECTED  Fundus firm @ U, lochia rubra minimal. V/s within defined limits.     Problem: Potential for postpartum infection related to presence of episiotomy/vaginal tear and/or uterine contamination  Goal: Patient will be absent from signs and symptoms of infection  Outcome: PROGRESSING AS EXPECTED  Patient has no S/S of infection noted @ this time.

## 2018-07-31 NOTE — DISCHARGE INSTRUCTIONS
POSTPARTUM DISCHARGE INSTRUCTIONS FOR MOM    YOB: 1985   Age: 33 y.o.               Admit Date: 2018     Discharge Date: 2018  Attending Doctor:  Sean Jimenez M.D.                  Allergies:  Patient has no known allergies.    Discharged to home by car. Discharged via wheelchair, hospital escort: Yes.  Special equipment needed: Not Applicable  Belongings with: Personal  Be sure to schedule a follow-up appointment with your primary care doctor or any specialists as instructed.     Discharge Plan:   Activity Level: Discussed  Confirmed Follow up Appointment: Patient to Call and Schedule Appointment  Confirmed Symptoms Management: Discussed  Influenza Vaccine Indication: Indicated: Not available from distributor/    REASONS TO CALL YOUR OBSTETRICIAN:  1.   Persistent fever or shaking chills (Temperature higher than 100.4)  2.   Heavy bleeding (soaking more than 1 pad per hour); Passing clots  3.   Foul odor from vagina  4.   Mastitis (Breast infection; breast pain, chills, fever, redness)  5.   Urinary pain, burning or frequency  6.   Episiotomy infection  7.   Abdominal incision infection  8.   Severe depression longer than 24 hours    HAND WASHING  · Prior to handling the baby.  · Before breastfeeding or bottle feeding baby.  · After using the bathroom or changing the baby's diaper.    WOUND CARE  Ask your physician for additional care instructions.  In general:    ·  Incision:      · Keep clean and dry.    · Do NOT lift anything heavier than your baby for up to 6 weeks.    · There should not be any opening or pus.      VAGINAL CARE  · Nothing inside vagina for 6 weeks: no sexual intercourse, tampons or douching.  · Bleeding may continue for 2-4 weeks.  Amount may vary.    · Call your physician for heavy bleeding which means soaking more than 1 pad per hour    BIRTH CONTROL  · It is possible to become pregnant at any time after delivery and while breastfeeding.  · Plan  "to discuss a method of birth control with your physician at your follow up visit. visit.    DIET AND ELIMINATION  · Eating more fiber (bran cereal, fruits, and vegetables) and drinking plenty of fluids will help to avoid constipation.  · Urinary frequency after childbirth is normal.    POSTPARTUM BLUES  During the first few days after birth, you may experience a sense of the \"blues\" which may include impatience, irritability or even crying.  These feeling come and go quickly.  However, as many as 1 in 10 women experience emotional symptoms known as postpartum depression.    Postpartum depression:  May start as early as the second or third day after delivery or take several weeks or months to develop.  Symptoms of \"blues\" are present, but are more intense:  Crying spells; loss of appetite; feelings of hopelessness or loss of control; fear of touching the baby; over concern or no concern at all about the baby; little or no concern about your own appearance/caring for yourself; and/or inability to sleep or excessive sleeping.  Contact your physician if you are experiencing any of these symptoms.    Crisis Hotline:  · [place] Crisis Hotline:  3-261-PGYREEH  Or 1-559.259.4006  · [place] Crisis Hotline:  1-691.800.6725  Or 307-233-5717    PREVENTING SHAKEN BABY:  If you are angry or stressed, PUT THE BABY IN THE CRIB, step away, take some deep breaths, and wait until you are calm to care for the baby.  DO NOT SHAKE THE BABY.  You are not alone, call a supporter for help.    · Crisis Call Center 24/7 crisis line 861-841-4388 or 1-159.716.1346  · You can also text them, text \"ANSWER\" to 259530    QUIT SMOKING/TOBACCO USE:  I understand the use of any tobacco products increases my chance of suffering from future heart disease and could cause other illnesses which may shorten my life. Quitting the use of tobacco products is the single most important thing I can do to improve my health. For further information on smoking / " tobacco cessation call a Toll Free Quit Line at 1-242.886.1110 (*[employer]) or 1-846.649.8406 (American Lung Association) or you can access the web based program at www.lungusa.org.    · [place] Tobacco Users Help Line:  (388) 957-1445       Toll Free: 1-694.537.1978  · Quit Tobacco Program [facility] (549)711-7441    DEPRESSION / SUICIDE RISK:  As you are discharged from this [facility], it is important to learn how to keep safe from harming yourself.    Recognize the warning signs:  · Abrupt changes in personality, positive or negative- including increase in energy   · Giving away possessions  · Change in eating patterns- significant weight changes-  positive or negative  · Change in sleeping patterns- unable to sleep or sleeping all the time   · Unwillingness or inability to communicate  · Depression  · Unusual sadness, discouragement and loneliness  · Talk of wanting to die  · Neglect of personal appearance   · Rebelliousness- reckless behavior  · Withdrawal from people/activities they love  · Confusion- inability to concentrate     If you or a loved one observes any of these behaviors or has concerns about self-harm, here's what you can do:  · Talk about it- your feelings and reasons for harming yourself  · Remove any means that you might use to hurt yourself (examples: pills, rope, extension cords, firearm)  · Get professional help from the community (Mental Health, Substance Abuse, psychological counseling)  · Do not be alone:Call your Safe Contact- someone whom you trust who will be there for you.  · Call your local CRISIS HOTLINE 291-7738 or 354-913-2490  · Call your local Children's Mobile Crisis Response Team [place] (122) 737-2761 or www.[employer]  · Call the toll free National Suicide Prevention Hotlines   · National Suicide Prevention Lifeline 093-244-PHBL (6900)  · National Hope Line Network 800-SUICIDE (303-4871)    DISCHARGE  SURVEY:  Thank you for choosing [facility] [employer].  We hope we provided you with very good care.  You may be receiving a survey in the mail.  Please fill it out.  Your opinion is valuable to us.    ADDITIONAL EDUCATIONAL MATERIALS GIVEN TO PATIENT:        My signature on this form indicates that:  1.  I have reviewed and understand the above information  2.  My questions regarding this information have been answered to my satisfaction.  3.  I have formulated a plan with my discharge nurse to obtain my prescribed medication for home.

## 2018-08-01 NOTE — PROGRESS NOTES
Mary Azar R.N. Lactation Consultant Signed   Progress Notes Date of Service: 7/31/2018  3:00 PM         []Hide copied text  []Hover for attribution information  Tried to see this Mom/baby earlier but baby was being circumcised. He has a history of low blood sugar, received gel per protocol and was being bottle fed formula to keep blood sugars up. Attempted to help get baby to latch after he came back from the circ procedure. He would place his mouth over mother's nipple/areola take a few sucks get fussy and pull off. This was tried several times so placed baby on mother's chest and waited about 30 min. After this time baby was a little more awake but still wouldn't sustain a latch. Couplet is to be discharged today. Reported to RN and will come back later to see if baby did any better.     Plan is to let Mom/baby go home with a hand pump since Mom can't get a pump today, and continue to try to get baby to latch Q3hrs and then supplement using the goldenrod guide since blood sugars have been in acceptable range. Her care is through [facility]. Baby will follow up with [facility].

## 2018-08-30 ENCOUNTER — POST PARTUM (OUTPATIENT)
Dept: OBGYN | Facility: CLINIC | Age: 33
End: 2018-08-30

## 2018-08-30 VITALS — SYSTOLIC BLOOD PRESSURE: 118 MMHG | WEIGHT: 159 LBS | DIASTOLIC BLOOD PRESSURE: 78 MMHG | BODY MASS INDEX: 26.46 KG/M2

## 2018-08-30 PROCEDURE — 90050 PR POSTPARTUM VISIT: CPT | Performed by: NURSE PRACTITIONER

## 2018-08-30 ASSESSMENT — ENCOUNTER SYMPTOMS
GASTROINTESTINAL NEGATIVE: 1
CARDIOVASCULAR NEGATIVE: 1
PSYCHIATRIC NEGATIVE: 1
NEUROLOGICAL NEGATIVE: 1
EYES NEGATIVE: 1
CONSTITUTIONAL NEGATIVE: 1
MUSCULOSKELETAL NEGATIVE: 1
RESPIRATORY NEGATIVE: 1

## 2018-08-30 NOTE — PROGRESS NOTES
Pt here today for postpartum exam, pt delivered on 7/29/18 ( pt was a walk in to L&D)  Currently both breast and bottle feeding.   BCM: pt would like information on different types of birth control, [facility] and [facility].   Good ph: [phone]   Pt states having some menstrual cramps.   Pt states [place].   Chaperone offered and provided

## 2018-08-30 NOTE — PROGRESS NOTES
Subjective:    Salomon Ramos is a 33 y.o. female who presents for her postpartum exam 4.5 weeks following . Her prenatal course was complicated by elevated glucose per her records from [place]. Denies any other problems. Her delivery was complicated by elevated BP requiring magnesium sulfate and IV antihypertensives. She denies dysuria, vaginal bleeding, odor, itching or breast problems. She is breast and bottle feeding. She desires condoms for her birth control method. Reports no sex prior to this appointment. Eating a regular diet without difficulty. Bowel movement are Normal.  The patient is not having any pain. Stopped bleeding 2 weeks ago without return of regular menses. Patient Denies Incisional pain, drainage or redness. Patient denies s/sx of postpartum depression.     Problem List     There are no active problems to display for this patient.      Objective    See PE  Lab: H&H at d/c: 11.2/33.8  /78   Wt 72.1 kg (159 lb)   BMI 26.46 kg/m²     Assessment:    1. PP care of lactating women   2. Exam WNL   3. Pap- none on file. Declines today as she is moving back to [facility] where she has better healthcare coverage  4. Desires contraception- condoms      Plan:    1. Breastfeeding support   2. Continue PNV   3. Contraceptive counseling - follow up w health dept,  [facility], or [facility] for contraceptive changes, future pregnancy, or other women's health care needs  4. Encouraged condom use for STI and pregnancy protection  5. Discussed diet, exercise and resumption of sexual activity   6. Preconception guidance for next pregnancy if applicable. Discussed pregnancy spacing. Discussed pregnancy spacing risk factors. Folic acid for all women of childbearing age.       HPI    Review of Systems   Constitutional: Negative.    HENT: Negative.    Eyes: Negative.    Respiratory: Negative.    Cardiovascular: Negative.    Gastrointestinal: Negative.    Genitourinary: Negative.     Musculoskeletal: Negative.    Skin: Negative.    Neurological: Negative.    Endo/Heme/Allergies: Negative.    Psychiatric/Behavioral: Negative.    All other systems reviewed and are negative.         Objective:     /78   Wt 72.1 kg (159 lb)   BMI 26.46 kg/m²      Physical Exam   Constitutional: She is oriented to person, place, and time. She appears well-developed and well-nourished.   HENT:   Head: Normocephalic.   Nose: Nose normal.   Eyes: Conjunctivae are normal.   Neck: Normal range of motion.   Cardiovascular: Normal rate, regular rhythm and normal heart sounds.    Pulmonary/Chest: Effort normal and breath sounds normal.   Abdominal: Soft. Bowel sounds are normal.   Genitourinary: Vagina normal and uterus normal.   Musculoskeletal: Normal range of motion.   Neurological: She is alert and oriented to person, place, and time.   Skin: Skin is warm and dry. Capillary refill takes less than 2 seconds.   Psychiatric: She has a normal mood and affect. Her behavior is normal. Judgment and thought content normal.   Nursing note and vitals reviewed.        Assessment/Plan:     1. Postpartum care and examination of lactating mother   18- No local prenatal care